# Patient Record
Sex: FEMALE | Race: BLACK OR AFRICAN AMERICAN | NOT HISPANIC OR LATINO | Employment: FULL TIME | URBAN - METROPOLITAN AREA
[De-identification: names, ages, dates, MRNs, and addresses within clinical notes are randomized per-mention and may not be internally consistent; named-entity substitution may affect disease eponyms.]

---

## 2024-07-18 ENCOUNTER — APPOINTMENT (OUTPATIENT)
Dept: URGENT CARE | Facility: CLINIC | Age: 44
End: 2024-07-18
Payer: OTHER MISCELLANEOUS

## 2024-07-18 PROCEDURE — 99283 EMERGENCY DEPT VISIT LOW MDM: CPT

## 2024-07-18 PROCEDURE — G0382 LEV 3 HOSP TYPE B ED VISIT: HCPCS

## 2024-07-25 ENCOUNTER — OCCMED (OUTPATIENT)
Dept: URGENT CARE | Facility: CLINIC | Age: 44
End: 2024-07-25
Payer: OTHER MISCELLANEOUS

## 2024-07-25 DIAGNOSIS — S39.012D LUMBAR STRAIN, SUBSEQUENT ENCOUNTER: Primary | ICD-10-CM

## 2024-07-25 PROCEDURE — 99213 OFFICE O/P EST LOW 20 MIN: CPT | Performed by: PHYSICIAN ASSISTANT

## 2024-09-06 ENCOUNTER — OCCMED (OUTPATIENT)
Dept: URGENT CARE | Facility: CLINIC | Age: 44
End: 2024-09-06

## 2024-09-06 ENCOUNTER — APPOINTMENT (OUTPATIENT)
Dept: RADIOLOGY | Facility: CLINIC | Age: 44
End: 2024-09-06

## 2024-09-06 DIAGNOSIS — Z92.89 HISTORY OF POSITIVE PPD: Primary | ICD-10-CM

## 2024-09-06 PROCEDURE — 71046 X-RAY EXAM CHEST 2 VIEWS: CPT

## 2024-09-30 ENCOUNTER — TELEPHONE (OUTPATIENT)
Dept: ENDOCRINOLOGY | Facility: CLINIC | Age: 44
End: 2024-09-30

## 2024-09-30 NOTE — TELEPHONE ENCOUNTER
[facility] Appointment Request   Provider: Isabel  Appointment Type: new  Reason for Visit: Referred by PCP , Dr. Kirk for thyroid nodule, no availability within 4 weeks   Available Day and Time: prefers [facility] location  Best Contact Number:  [phone]

## 2024-11-22 ENCOUNTER — OFFICE VISIT (OUTPATIENT)
Dept: ENDOCRINOLOGY | Facility: CLINIC | Age: 44
End: 2024-11-22
Payer: COMMERCIAL

## 2024-11-22 VITALS
OXYGEN SATURATION: 98 % | DIASTOLIC BLOOD PRESSURE: 90 MMHG | RESPIRATION RATE: 16 BRPM | BODY MASS INDEX: 28.89 KG/M2 | WEIGHT: 169.2 LBS | SYSTOLIC BLOOD PRESSURE: 126 MMHG | HEIGHT: 64 IN | HEART RATE: 89 BPM

## 2024-11-22 DIAGNOSIS — E04.2 MULTINODULAR GOITER: Primary | ICD-10-CM

## 2024-11-22 PROBLEM — E78.5 HYPERLIPIDEMIA: Status: ACTIVE | Noted: 2024-11-22

## 2024-11-22 PROBLEM — I51.7 LVH (LEFT VENTRICULAR HYPERTROPHY): Status: ACTIVE | Noted: 2024-11-22

## 2024-11-22 PROBLEM — I10 HYPERTENSION: Status: ACTIVE | Noted: 2024-11-22

## 2024-11-22 PROBLEM — D21.9 FIBROID: Status: ACTIVE | Noted: 2024-11-22

## 2024-11-22 PROBLEM — E04.1 NONTOXIC SINGLE THYROID NODULE: Status: ACTIVE | Noted: 2024-11-22

## 2024-11-22 PROCEDURE — 3074F SYST BP LT 130 MM HG: CPT | Performed by: INTERNAL MEDICINE

## 2024-11-22 PROCEDURE — 3008F BODY MASS INDEX DOCD: CPT | Performed by: INTERNAL MEDICINE

## 2024-11-22 PROCEDURE — 99205 OFFICE O/P NEW HI 60 MIN: CPT | Performed by: INTERNAL MEDICINE

## 2024-11-22 PROCEDURE — 3080F DIAST BP >= 90 MM HG: CPT | Performed by: INTERNAL MEDICINE

## 2024-11-22 RX ORDER — ACETAMINOPHEN 500 MG
500 TABLET ORAL EVERY 6 HOURS PRN
COMMUNITY
End: 2024-11-22

## 2024-11-22 RX ORDER — AMLODIPINE BESYLATE 10 MG/1
10 TABLET ORAL DAILY
COMMUNITY
Start: 2024-09-04

## 2024-11-22 NOTE — PROGRESS NOTES
Endocrinology Consultation Report     Patient Name: Anastacia Santiago  MR#: [id number]  : 1980  Consultant: Virginia Hall MD      Anastacia Santiago is a 44 y.o. female who presents for evaluation for thyroid nodules    HPI:  She noted enlarged thyroid during pregnancy in 2018. She was hoping it would improve. She did not have it evaluated until  she saw her primary last year.   He referred for a thyroid biopsy which was done at [facility] radiology on 2203, they did FNA of left 5 cm TR3 nodule. The result was reported to be benign.   She had repeat thyroid ultrasound on 2024 which was stable.     She has occasional pain in the back of the left neck, it was severe a few months ago. It is now better. Sometimes she has numbness in the left arm.   No dysphagia, no choking sensation, no voice changes. She has history of snoring.   No history of head and neck radiation  No FH of thyroid cancer     She was diagnosed with HTN in August  She is following with cardiology    S/p NVD  and  no gestational DM      Medical History:  Past Medical History:   Diagnosis Date    Hypertension     Thyroid nodule        Surgical History:   No past surgical history on file.    Family History:  Family History   Problem Relation Name Age of Onset    Hypertension Biological Mother      Diabetes Biological Father          Social history:  Social History     Socioeconomic History    Marital status:      Spouse name: Not on file    Number of children: Not on file    Years of education: Not on file    Highest education level: Not on file   Occupational History    Not on file   Tobacco Use    Smoking status: Never    Smokeless tobacco: Never   Substance and Sexual Activity    Alcohol use: Never    Drug use: Defer    Sexual activity: Defer   Other Topics Concern    Not on file   Social History Narrative    Not on file     Social Drivers of Health     Financial Resource Strain: Not on file   Food  "Insecurity: Not on file   Transportation Needs: Not on file   Physical Activity: Not on file   Stress: Not on file   Social Connections: Not on file   Intimate Partner Violence: Not on file   Housing Stability: Not on file       Medication(active prior to today):  Current Outpatient Medications   Medication Sig Dispense Refill    amLODIPine (NORVASC) 10 mg tablet Take 10 mg by mouth daily.       No current facility-administered medications for this visit.       Allergies:  Patient has no known allergies.    Review of Systems  All other systems reviewed and negative except as noted in HPI    Vitals:    11/22/24 1301   BP: (!) 126/90   BP Location: Right upper arm   Patient Position: Sitting   Pulse: 89   Resp: 16   SpO2: 98%   Weight: 76.7 kg (169 lb 3.2 oz)   Height: 1.626 m (5' 4\")     Wt Readings from Last 3 Encounters:   11/22/24 76.7 kg (169 lb 3.2 oz)       Body mass index is 29.04 kg/m².    Physical Exam  Constitutional:       Appearance: She is well-developed.   HENT:      Head: Normocephalic and atraumatic.   Eyes:      Extraocular Movements: Extraocular movements intact.      Conjunctiva/sclera: Conjunctivae normal.      Pupils: Pupils are equal, round, and reactive to light.   Neck:      Trachea: No tracheal deviation.      Comments: Large goiter with a large midline and left-sided thyroid nodule.  Nontender.  Freely movable  Cardiovascular:      Rate and Rhythm: Normal rate and regular rhythm.      Heart sounds: Normal heart sounds.   Pulmonary:      Effort: Pulmonary effort is normal.   Musculoskeletal:         General: No swelling or deformity.   Lymphadenopathy:      Cervical: No cervical adenopathy.   Skin:     General: Skin is warm and dry.      Findings: No bruising or rash.   Neurological:      Motor: No weakness or tremor.   Psychiatric:         Mood and Affect: Mood normal.         Behavior: Behavior normal.         No results found for: \"HGBA1C\", \"CREATININE\", \"K\"  No results found for: \"TSH\", " "\"T3FREE\", \"FREET4\", \"TRIG\", \"LDL\", \"CHOL\", \"LDLDIRECT\", \"ALT\"        5/16/2024  TSH 1.91  Total T46.6  Glucose 100  Sodium 140  Calcium 9.5      ULTRASOUND THYROID 8/8/2024 ([facility])  Order: [id number]  Impression      1. A 1.2 cm TR 4 nodule in the superior right thyroid lobe for which follow-up  is recommended in 1 year.    2. A 0.9 cm TR 4 nodule in the posterior mid right thyroid lobe which does not  require routine follow-up imaging per TI-RADS criteria.    3. A stable 5 cm TR 3 nodule replacing almost the entirety of the left thyroid  lobe which was previously biopsied with reported benign pathology result.    ACR TI-RADS  TR1 - Benign - No FNA or routine follow-up recommended.  TR2 - Not suspicious - No FNA or routine follow-up recommended.  TR3 - FNA is recommended for nodules >= 2.5 cm. Follow-up if nodule is >= 1.5 cm  and <2.5 cm. Suggested follow up interval is at 1, 3, and 5 years. Consider  discontinuing routine follow up after 5 years if stable.  TR4 - FNA is recommended for nodules >= 1.5 cm. Follow-up if nodule is >= 1 cm  and <1.5 cm. Suggested follow up interval is at 1, 2, 3, and 5 years. Consider  discontinuing routine follow up after 5 years if stable.  TR5 - FNA is recommended for nodules >= 1 cm. Follow-up if nodule is >= 0.5 cm  and <1 cm. Suggested follow up is every year for 5 years. Consider discontinuing  routine follow up after 5 years if stable.    Reference: BERHANE HunterN. et al. ACR Thyroid imaging, report and data system  (TI-RADS): White paper of the ACR TI-RADS committee. JACR. 14(4): 587-595 (May  2017).    Exam Complete Date and Time 8/8/2024 3:23 PM , Report Signed Date And Time  8/8/2024 4:15 PM.  The films and dictation have been reviewed with the Resident and I  agree with the findings.  Narrative    Clinical Indication: Nodular goiter    Examination: Ultrasound of the thyroid gland was performed.      Comparison: Thyroid biopsy procedural images " 7/21/2023, thyroid ultrasound  6/29/2023.    Thyroid: The thyroid gland is normal in size. Heterogeneous thyroid parenchyma  secondary to multiple thyroid nodules described in further detail below. Normal  thyroid vascularity is demonstrated on color Doppler.    Isthmus: 0.5 cm in AP diameter  Right lobe: 4.0 cm x 1.6 cm x 1.6 cm (length x width x depth)  Left lobe: 5.8 cm x 2.2 cm x 3.2 cm (length x width x depth)    Thyroid Nodules:  Nodule #1: Not significantly changed in size.  Size: 1.2 x 1.0 x 0.5 cm, previously 1.2 x 0.9 x 0.4 cm  Location: Superior right thyroid lobe  Composition: Almost completely solid (2)  Echogenicity: Hypoechoic (2)  Shape: Wider-than-tall (0)  Margin: Ill-defined 0)  Echogenic Foci: None (0)  TI-RADS category = TI-RADS-4 (4 to 6 points)    Nodule #2: Not significantly changed in size.  Size: 0.9 x 0.8 x 0.7 cm, previously 0.9 x 0.6 x 0.6 cm  Location: Posterior mid right thyroid lobe  Composition: Almost completely solid (2)  Echogenicity: Hypoechoic (2)  Shape: Wider-than-tall (0)  Margin: Ill-defined 0)  Echogenic Foci: None (0)  TI-RADS category = TI-RADS-4 (4 to 6 points)    Nodule #3: Not significantly changed in size per TI-RADS criteria.  Size: 5.0 x 3.5 x 4.8 cm, previously 4.7 x 5.0 x 3.2 cm  Location: Replacing almost the entirety of the left thyroid lobe  Composition: Almost completely solid (2)  Echogenicity: Isoechoic (1)  Shape: Wider-than-tall (0)  Margin: Smooth (0)  Echogenic Foci: None (0)  TI-RADS category = TI-RADS-3 (3 points)    Neck lymph nodes: No suspicious lymph nodes are identified adjacent to the  thyroid gland.  Exam End: 08/08/24 15:23    Specimen Collected: 08/08/24 14:55        Office thyroid ultrasound 11/22/2024  Right thyroid lobe is very small  Large goiter on the left side extending to the midline overlying the trachea and not pushing it.  Benign characteristics      Assessment/Plan   Multinodular goiter  First noted in 2018 during pregnancy  She  has evidence of a large goiter.  The right thyroid lobe is small, she has a large left thyroid nodule extending towards the midline without deviation of the trachea.    S/p ultrasound-guided FNA of left 5 cm nodule in July 2023 with benign results    History of normal thyroid function.  No indication for thyroid hormone replacement    I had a long discussion with the patient and her  about the diagnosis of goiter.  Thyroid medications will not shrink the goiter.  She is a candidate for thyroid surgery because of the size of the goiter.  Consider initial evaluation with the general surgery.    Problem List Items Addressed This Visit    None  Visit Diagnoses       Multinodular goiter    -  Primary          Patient Instructions   - You have a goiter, which means that your thyroid is enlarged.  You have a big nodule on the left side in the middle of the thyroid.  This is genetic  You had a thyroid biopsy which was benign.  Therefore no concern for thyroid cancer    -Benign goiters grow slowly over a long period of time    -Your thyroid blood test is normal which means that your thyroid is doing its job perfectly well.  You do not have hypothyroidism and you also do not have hyperthyroidism    -Thyroid medication does not help shrink the thyroid    -The only treatment would be to have surgery especially if you have symptoms  Over time as the thyroid grows it can potentially cause difficulty swallowing and in some cases difficulty breathing especially when you are laying down.  It is rare to develop those symptoms and if they do they happen very gradually over a long period of time    -You can make an appointment with the surgeon to have an initial discussion  Dr. Caio Yu at [facility]  Dr. Julio César Galan at [place]  Dr. Smith at [place]          I spent 60 minutes on this date of service performing the following activities: obtaining history, performing examination, entering orders, documenting, preparing for  visit, obtaining / reviewing records, providing counseling and education, independently reviewing study/studies and coordinating care.      Virginia Hall MD

## 2024-11-22 NOTE — LETTER
2024     Kevyn Kirk MD  [address] [address]    Patient: Anastacia Santiago  YOB: 1980  Date of Visit: 2024      Dear Dr. Kirk:    Thank you for referring Anastacia Santiago to me for evaluation. Below are my notes for this consultation.    If you have questions, please do not hesitate to call me. I look forward to following your patient along with you.         Sincerely,        Virginia Hall MD        CC: No Recipients    Virginia Hall MD  2024  9:04 PM  Sign when Signing Visit         Endocrinology Consultation Report     Patient Name: Anastacia Santiago  MR#: [id number]  : 1980  Consultant: Virginia Hall MD      Anastacia Santiago is a 44 y.o. female who presents for evaluation for thyroid nodules    HPI:  She noted enlarged thyroid during pregnancy in 2018. She was hoping it would improve. She did not have it evaluated until  she saw her primary last year.   He referred for a thyroid biopsy which was done at [facility] radiology on 2203, they did FNA of left 5 cm TR3 nodule. The result was reported to be benign.   She had repeat thyroid ultrasound on 2024 which was stable.     She has occasional pain in the back of the left neck, it was severe a few months ago. It is now better. Sometimes she has numbness in the left arm.   No dysphagia, no choking sensation, no voice changes. She has history of snoring.   No history of head and neck radiation  No FH of thyroid cancer     She was diagnosed with HTN in August  She is following with cardiology    S/p NVD  and  no gestational DM      Medical History:  Past Medical History:   Diagnosis Date   • Hypertension    • Thyroid nodule        Surgical History:   No past surgical history on file.    Family History:  Family History   Problem Relation Name Age of Onset   • Hypertension Biological Mother     • Diabetes Biological Father          Social history:  Social History  "    Socioeconomic History   • Marital status:      Spouse name: Not on file   • Number of children: Not on file   • Years of education: Not on file   • Highest education level: Not on file   Occupational History   • Not on file   Tobacco Use   • Smoking status: Never   • Smokeless tobacco: Never   Substance and Sexual Activity   • Alcohol use: Never   • Drug use: Defer   • Sexual activity: Defer   Other Topics Concern   • Not on file   Social History Narrative   • Not on file     Social Drivers of Health     Financial Resource Strain: Not on file   Food Insecurity: Not on file   Transportation Needs: Not on file   Physical Activity: Not on file   Stress: Not on file   Social Connections: Not on file   Intimate Partner Violence: Not on file   Housing Stability: Not on file       Medication(active prior to today):  Current Outpatient Medications   Medication Sig Dispense Refill   • amLODIPine (NORVASC) 10 mg tablet Take 10 mg by mouth daily.       No current facility-administered medications for this visit.       Allergies:  Patient has no known allergies.    Review of Systems  All other systems reviewed and negative except as noted in HPI    Vitals:    11/22/24 1301   BP: (!) 126/90   BP Location: Right upper arm   Patient Position: Sitting   Pulse: 89   Resp: 16   SpO2: 98%   Weight: 76.7 kg (169 lb 3.2 oz)   Height: 1.626 m (5' 4\")     Wt Readings from Last 3 Encounters:   11/22/24 76.7 kg (169 lb 3.2 oz)       Body mass index is 29.04 kg/m².    Physical Exam  Constitutional:       Appearance: She is well-developed.   HENT:      Head: Normocephalic and atraumatic.   Eyes:      Extraocular Movements: Extraocular movements intact.      Conjunctiva/sclera: Conjunctivae normal.      Pupils: Pupils are equal, round, and reactive to light.   Neck:      Trachea: No tracheal deviation.      Comments: Large goiter with a large midline and left-sided thyroid nodule.  Nontender.  Freely movable  Cardiovascular:      " "Rate and Rhythm: Normal rate and regular rhythm.      Heart sounds: Normal heart sounds.   Pulmonary:      Effort: Pulmonary effort is normal.   Musculoskeletal:         General: No swelling or deformity.   Lymphadenopathy:      Cervical: No cervical adenopathy.   Skin:     General: Skin is warm and dry.      Findings: No bruising or rash.   Neurological:      Motor: No weakness or tremor.   Psychiatric:         Mood and Affect: Mood normal.         Behavior: Behavior normal.         No results found for: \"HGBA1C\", \"CREATININE\", \"K\"  No results found for: \"TSH\", \"T3FREE\", \"FREET4\", \"TRIG\", \"LDL\", \"CHOL\", \"LDLDIRECT\", \"ALT\"        5/16/2024  TSH 1.91  Total T46.6  Glucose 100  Sodium 140  Calcium 9.5      ULTRASOUND THYROID 8/8/2024 ([facility])  Order: [id number]  Impression      1. A 1.2 cm TR 4 nodule in the superior right thyroid lobe for which follow-up  is recommended in 1 year.    2. A 0.9 cm TR 4 nodule in the posterior mid right thyroid lobe which does not  require routine follow-up imaging per TI-RADS criteria.    3. A stable 5 cm TR 3 nodule replacing almost the entirety of the left thyroid  lobe which was previously biopsied with reported benign pathology result.    ACR TI-RADS  TR1 - Benign - No FNA or routine follow-up recommended.  TR2 - Not suspicious - No FNA or routine follow-up recommended.  TR3 - FNA is recommended for nodules >= 2.5 cm. Follow-up if nodule is >= 1.5 cm  and <2.5 cm. Suggested follow up interval is at 1, 3, and 5 years. Consider  discontinuing routine follow up after 5 years if stable.  TR4 - FNA is recommended for nodules >= 1.5 cm. Follow-up if nodule is >= 1 cm  and <1.5 cm. Suggested follow up interval is at 1, 2, 3, and 5 years. Consider  discontinuing routine follow up after 5 years if stable.  TR5 - FNA is recommended for nodules >= 1 cm. Follow-up if nodule is >= 0.5 cm  and <1 cm. Suggested follow up is every year for 5 years. Consider " discontinuing  routine follow up after 5 years if stable.    Reference: NALLELY Hunter. et al. ACR Thyroid imaging, report and data system  (TI-RADS): White paper of the ACR TI-RADS committee. JACR. 14(3): 290-886 (May  2017).    Exam Complete Date and Time 8/8/2024 3:23 PM , Report Signed Date And Time  8/8/2024 4:15 PM.  The films and dictation have been reviewed with the Resident and I  agree with the findings.  Narrative    Clinical Indication: Nodular goiter    Examination: Ultrasound of the thyroid gland was performed.      Comparison: Thyroid biopsy procedural images 7/21/2023, thyroid ultrasound  6/29/2023.    Thyroid: The thyroid gland is normal in size. Heterogeneous thyroid parenchyma  secondary to multiple thyroid nodules described in further detail below. Normal  thyroid vascularity is demonstrated on color Doppler.    Isthmus: 0.5 cm in AP diameter  Right lobe: 4.0 cm x 1.6 cm x 1.6 cm (length x width x depth)  Left lobe: 5.8 cm x 2.2 cm x 3.2 cm (length x width x depth)    Thyroid Nodules:  Nodule #1: Not significantly changed in size.  Size: 1.2 x 1.0 x 0.5 cm, previously 1.2 x 0.9 x 0.4 cm  Location: Superior right thyroid lobe  Composition: Almost completely solid (2)  Echogenicity: Hypoechoic (2)  Shape: Wider-than-tall (0)  Margin: Ill-defined 0)  Echogenic Foci: None (0)  TI-RADS category = TI-RADS-4 (4 to 6 points)    Nodule #2: Not significantly changed in size.  Size: 0.9 x 0.8 x 0.7 cm, previously 0.9 x 0.6 x 0.6 cm  Location: Posterior mid right thyroid lobe  Composition: Almost completely solid (2)  Echogenicity: Hypoechoic (2)  Shape: Wider-than-tall (0)  Margin: Ill-defined 0)  Echogenic Foci: None (0)  TI-RADS category = TI-RADS-4 (4 to 6 points)    Nodule #3: Not significantly changed in size per TI-RADS criteria.  Size: 5.0 x 3.5 x 4.8 cm, previously 4.7 x 5.0 x 3.2 cm  Location: Replacing almost the entirety of the left thyroid lobe  Composition: Almost completely solid  (2)  Echogenicity: Isoechoic (1)  Shape: Wider-than-tall (0)  Margin: Smooth (0)  Echogenic Foci: None (0)  TI-RADS category = TI-RADS-3 (3 points)    Neck lymph nodes: No suspicious lymph nodes are identified adjacent to the  thyroid gland.  Exam End: 08/08/24 15:23    Specimen Collected: 08/08/24 14:55        Office thyroid ultrasound 11/22/2024  Right thyroid lobe is very small  Large goiter on the left side extending to the midline overlying the trachea and not pushing it.  Benign characteristics      Assessment/Plan  Multinodular goiter  First noted in 2018 during pregnancy  She has evidence of a large goiter.  The right thyroid lobe is small, she has a large left thyroid nodule extending towards the midline without deviation of the trachea.    S/p ultrasound-guided FNA of left 5 cm nodule in July 2023 with benign results    History of normal thyroid function.  No indication for thyroid hormone replacement    I had a long discussion with the patient and her  about the diagnosis of goiter.  Thyroid medications will not shrink the goiter.  She is a candidate for thyroid surgery because of the size of the goiter.  Consider initial evaluation with the general surgery.    Problem List Items Addressed This Visit    None  Visit Diagnoses       Multinodular goiter    -  Primary          Patient Instructions   - You have a goiter, which means that your thyroid is enlarged.  You have a big nodule on the left side in the middle of the thyroid.  This is genetic  You had a thyroid biopsy which was benign.  Therefore no concern for thyroid cancer    -Benign goiters grow slowly over a long period of time    -Your thyroid blood test is normal which means that your thyroid is doing its job perfectly well.  You do not have hypothyroidism and you also do not have hyperthyroidism    -Thyroid medication does not help shrink the thyroid    -The only treatment would be to have surgery especially if you have symptoms  Over time  as the thyroid grows it can potentially cause difficulty swallowing and in some cases difficulty breathing especially when you are laying down.  It is rare to develop those symptoms and if they do they happen very gradually over a long period of time    -You can make an appointment with the surgeon to have an initial discussion  Dr. Caio Yu at [facility]  Dr. Julio César Galan at [place]  Dr. Smith at [place]          I spent 60 minutes on this date of service performing the following activities: obtaining history, performing examination, entering orders, documenting, preparing for visit, obtaining / reviewing records, providing counseling and education, independently reviewing study/studies and coordinating care.      Virginia Hall MD

## 2024-11-22 NOTE — PATIENT INSTRUCTIONS
- You have a goiter, which means that your thyroid is enlarged.  You have a big nodule on the left side in the middle of the thyroid.  This is genetic  You had a thyroid biopsy which was benign.  Therefore no concern for thyroid cancer    -Benign goiters grow slowly over a long period of time    -Your thyroid blood test is normal which means that your thyroid is doing its job perfectly well.  You do not have hypothyroidism and you also do not have hyperthyroidism    -Thyroid medication does not help shrink the thyroid    -The only treatment would be to have surgery especially if you have symptoms  Over time as the thyroid grows it can potentially cause difficulty swallowing and in some cases difficulty breathing especially when you are laying down.  It is rare to develop those symptoms and if they do they happen very gradually over a long period of time    -You can make an appointment with the surgeon to have an initial discussion  Dr. Caio Yu at [facility]  Dr. Julio César Galan at [place]  Dr. Smith at [place]